# Patient Record
Sex: FEMALE | Employment: FULL TIME | URBAN - METROPOLITAN AREA
[De-identification: names, ages, dates, MRNs, and addresses within clinical notes are randomized per-mention and may not be internally consistent; named-entity substitution may affect disease eponyms.]

---

## 2017-03-22 ENCOUNTER — TELEPHONE (OUTPATIENT)
Dept: INTERNAL MEDICINE CLINIC | Facility: CLINIC | Age: 47
End: 2017-03-22

## 2017-03-22 NOTE — TELEPHONE ENCOUNTER
Patient has a history of low vitamin D levels. She scheduled a physical with Dr Alexandra Awan on Monday and is wondering if she could have a lab order for vitamin D level sent to [facility] prior to her appt.

## 2017-03-22 NOTE — TELEPHONE ENCOUNTER
Spoke to pt. Advised Dr. Mehrdad Gonzalez will order labs at time of visit. Can discuss Vit D level. Pt voiced understanding.

## 2017-03-25 ENCOUNTER — HOSPITAL ENCOUNTER (OUTPATIENT)
Dept: MAMMOGRAPHY | Age: 47
Discharge: HOME | End: 2017-03-25
Attending: OBSTETRICS & GYNECOLOGY
Payer: COMMERCIAL

## 2017-03-25 DIAGNOSIS — Z12.31 ENCOUNTER FOR SCREENING MAMMOGRAM FOR MALIGNANT NEOPLASM OF BREAST: ICD-10-CM

## 2017-03-25 PROCEDURE — 77063 BREAST TOMOSYNTHESIS BI: CPT

## 2017-03-25 PROCEDURE — 77067 SCR MAMMO BI INCL CAD: CPT

## 2017-03-27 ENCOUNTER — OFFICE VISIT (OUTPATIENT)
Dept: INTERNAL MEDICINE CLINIC | Facility: CLINIC | Age: 47
End: 2017-03-27

## 2017-03-27 VITALS
OXYGEN SATURATION: 99 % | WEIGHT: 150 LBS | DIASTOLIC BLOOD PRESSURE: 70 MMHG | RESPIRATION RATE: 12 BRPM | HEART RATE: 82 BPM | HEIGHT: 64.5 IN | SYSTOLIC BLOOD PRESSURE: 120 MMHG | BODY MASS INDEX: 25.3 KG/M2

## 2017-03-27 DIAGNOSIS — Z00.00 ROUTINE PHYSICAL EXAMINATION: Primary | ICD-10-CM

## 2017-03-27 PROCEDURE — 90715 TDAP VACCINE 7 YRS/> IM: CPT | Performed by: INTERNAL MEDICINE

## 2017-03-27 PROCEDURE — 99396 PREV VISIT EST AGE 40-64: CPT | Performed by: INTERNAL MEDICINE

## 2017-03-27 PROCEDURE — 90471 IMMUNIZATION ADMIN: CPT | Performed by: INTERNAL MEDICINE

## 2017-03-29 LAB
ABSOLUTE BASOPHILS: 27 CELLS/UL (ref 0–200)
ABSOLUTE EOSINOPHILS: 82 CELLS/UL (ref 15–500)
ABSOLUTE LYMPHOCYTES: 1673 CELLS/UL (ref 850–3900)
ABSOLUTE MONOCYTES: 340 CELLS/UL (ref 200–950)
ABSOLUTE NEUTROPHILS: 4678 CELLS/UL (ref 1500–7800)
ALBUMIN/GLOBULIN RATIO: 1.3 (CALC) (ref 1–2.5)
ALBUMIN: 4.1 G/DL (ref 3.6–5.1)
ALKALINE PHOSPHATASE: 56 U/L (ref 33–115)
ALT: 15 U/L (ref 6–29)
AST: 20 U/L (ref 10–35)
BASOPHILS: 0.4 %
BILIRUBIN, TOTAL: 0.8 MG/DL (ref 0.2–1.2)
BUN: 10 MG/DL (ref 7–25)
CALCIUM: 9.1 MG/DL (ref 8.6–10.2)
CARBON DIOXIDE: 29 MMOL/L (ref 20–31)
CHLORIDE: 103 MMOL/L (ref 98–110)
CHOL/HDLC RATIO: 3.6 (CALC)
CHOLESTEROL, TOTAL: 228 MG/DL (ref 125–200)
CREATININE: 0.78 MG/DL (ref 0.5–1.1)
EGFR IF AFRICN AM: 106 ML/MIN/1.73M2
EGFR IF NONAFRICN AM: 92 ML/MIN/1.73M2
EOSINOPHILS: 1.2 %
GLOBULIN: 3.1 G/DL (CALC) (ref 1.9–3.7)
GLUCOSE: 87 MG/DL (ref 65–99)
HDL CHOLESTEROL: 63 MG/DL
HEMATOCRIT: 39.2 % (ref 35–45)
HEMOGLOBIN: 12.8 G/DL (ref 11.7–15.5)
LDL-CHOLESTEROL: 125 MG/DL (CALC)
LYMPHOCYTES: 24.6 %
MCH: 29.1 PG (ref 27–33)
MCHC: 32.7 G/DL (ref 32–36)
MCV: 88.9 FL (ref 80–100)
MONOCYTES: 5 %
MPV: 8.3 FL (ref 7.5–12.5)
NEUTROPHILS: 68.8 %
NON-HDL CHOLESTEROL: 165 MG/DL (CALC)
PLATELET COUNT: 277 THOUSAND/UL (ref 140–400)
POTASSIUM: 4.2 MMOL/L (ref 3.5–5.3)
PROTEIN, TOTAL: 7.2 G/DL (ref 6.1–8.1)
RDW: 13.9 % (ref 11–15)
RED BLOOD CELL COUNT: 4.41 MILLION/UL (ref 3.8–5.1)
SODIUM: 138 MMOL/L (ref 135–146)
TRIGLYCERIDES: 202 MG/DL
TSH W/REFLEX TO FT4: 1.69 MIU/L
VITAMIN D, 25-OH, TOTAL: 35 NG/ML (ref 30–100)
WHITE BLOOD CELL COUNT: 6.8 THOUSAND/UL (ref 3.8–10.8)

## 2017-04-03 ENCOUNTER — OFFICE VISIT (OUTPATIENT)
Dept: INTERNAL MEDICINE CLINIC | Facility: CLINIC | Age: 47
End: 2017-04-03

## 2017-04-03 VITALS
RESPIRATION RATE: 12 BRPM | HEART RATE: 79 BPM | OXYGEN SATURATION: 99 % | BODY MASS INDEX: 25.3 KG/M2 | WEIGHT: 150 LBS | SYSTOLIC BLOOD PRESSURE: 110 MMHG | HEIGHT: 64.5 IN | DIASTOLIC BLOOD PRESSURE: 70 MMHG

## 2017-04-03 DIAGNOSIS — S05.01XA CORNEAL ABRASION, RIGHT, INITIAL ENCOUNTER: Primary | ICD-10-CM

## 2017-04-03 PROCEDURE — 99213 OFFICE O/P EST LOW 20 MIN: CPT | Performed by: INTERNAL MEDICINE

## 2017-04-03 RX ORDER — TOBRAMYCIN AND DEXAMETHASONE 3; 1 MG/ML; MG/ML
SUSPENSION/ DROPS OPHTHALMIC
Qty: 1 BOTTLE | Refills: 0 | Status: SHIPPED | OUTPATIENT
Start: 2017-04-03 | End: 2018-04-12 | Stop reason: ALTCHOICE

## 2017-04-03 NOTE — PROGRESS NOTES
Ruth Coughlin is a 40year old female.   HPI:   Jeremías Jesus in right eye late  Thursday  Thought she removed it  Used tweezers  Red the next day Friday  Vision ok  Hx allergies not taking allergy meds  Pt does wear contacs  ALL:  Zithromax               Pa Hypertension       \"parents\"   • Diabetes Father      \"DM2\"   • Diabetes Paternal Grandfather    • Breast Cancer Maternal Grandmother 58   • Alzheimer's[other] [OTHER] Maternal Grandfather    • Thyroid Disorder Mother         REVIEW OF SYSTEMS:   Robyn Gentile

## 2017-05-08 ENCOUNTER — HOSPITAL ENCOUNTER (OUTPATIENT)
Age: 47
Discharge: HOME | End: 2017-05-08
Attending: EMERGENCY MEDICINE
Payer: COMMERCIAL

## 2017-05-08 VITALS
RESPIRATION RATE: 20 BRPM | DIASTOLIC BLOOD PRESSURE: 62 MMHG | TEMPERATURE: 99 F | OXYGEN SATURATION: 98 % | BODY MASS INDEX: 25 KG/M2 | SYSTOLIC BLOOD PRESSURE: 110 MMHG | WEIGHT: 148 LBS | HEART RATE: 71 BPM

## 2017-05-08 DIAGNOSIS — J02.0 STREPTOCOCCAL SORE THROAT: Primary | ICD-10-CM

## 2017-05-08 PROCEDURE — 87430 STREP A AG IA: CPT

## 2017-05-08 PROCEDURE — 99203 OFFICE O/P NEW LOW 30 MIN: CPT

## 2017-05-08 PROCEDURE — 99204 OFFICE O/P NEW MOD 45 MIN: CPT

## 2017-05-08 RX ORDER — AMOXICILLIN 875 MG/1
875 TABLET, COATED ORAL 2 TIMES DAILY
Qty: 20 TABLET | Refills: 0 | Status: SHIPPED | OUTPATIENT
Start: 2017-05-08 | End: 2017-05-18

## 2017-05-08 NOTE — ED PROVIDER NOTES
Patient Seen in: [facility] In [address] [place] [address]    History   Patient presents with:  Sore Throat    Stated Complaint: sore throat     HPI    The patient is a 43-year-old female with no significant past medical history presents now with the UNITS Oral Cap,  Take 1 capsule by mouth daily.        Family History   Problem Relation Age of Onset   • Spina Bifida[other] [OTHER]       fam hx   • Hypertension       \"parents\"   • Diabetes Father      \"DM2\"   • Diabetes Paternal Grandfather    • Cristal EM POCT RAPID STREP - Abnormal; Notable for the following:     POCT Rapid Strep Positive (*)     All other components within normal limits       MDM           Disposition and Plan     Clinical Impression:  Streptococcal sore throat  (primary encounter d

## 2017-05-31 PROCEDURE — 88175 CYTOPATH C/V AUTO FLUID REDO: CPT | Performed by: OBSTETRICS & GYNECOLOGY

## 2017-05-31 PROCEDURE — 87624 HPV HI-RISK TYP POOLED RSLT: CPT | Performed by: OBSTETRICS & GYNECOLOGY

## 2017-08-08 ENCOUNTER — OFFICE VISIT (OUTPATIENT)
Dept: INTERNAL MEDICINE CLINIC | Facility: CLINIC | Age: 47
End: 2017-08-08

## 2017-08-08 VITALS
WEIGHT: 147 LBS | RESPIRATION RATE: 12 BRPM | HEART RATE: 66 BPM | DIASTOLIC BLOOD PRESSURE: 50 MMHG | SYSTOLIC BLOOD PRESSURE: 100 MMHG | BODY MASS INDEX: 24.79 KG/M2 | HEIGHT: 64.5 IN | OXYGEN SATURATION: 99 %

## 2017-08-08 DIAGNOSIS — Z20.818 EXPOSURE TO STREP THROAT: ICD-10-CM

## 2017-08-08 DIAGNOSIS — S56.912A FOREARM STRAIN, LEFT, INITIAL ENCOUNTER: Primary | ICD-10-CM

## 2017-08-08 DIAGNOSIS — S66.912A WRIST STRAIN, LEFT, INITIAL ENCOUNTER: ICD-10-CM

## 2017-08-08 DIAGNOSIS — S66.912A HAND STRAIN, LEFT, INITIAL ENCOUNTER: ICD-10-CM

## 2017-08-08 PROCEDURE — 99214 OFFICE O/P EST MOD 30 MIN: CPT | Performed by: INTERNAL MEDICINE

## 2017-08-08 NOTE — PROGRESS NOTES
Kenan De Leon is a 56year old female.   HPI:   CC: removing grout early July  Pain in left wrist since early July went away for 1 week or two now back  Used wrist brace for two weeks helped  Lifting/setting up  classroom /teacher  Still moving and liftin Onset   • Spina Bifida[Other] [OTHER] Other      spouse   • Hypertension       \"parents\"   • Diabetes Father      \"DM2\"   • Diabetes Paternal Grandfather    • Breast Cancer Maternal Grandmother 58   • Alzheimer's[Other] [OTHER] Maternal Grandfather

## 2017-10-30 PROCEDURE — 87660 TRICHOMONAS VAGIN DIR PROBE: CPT | Performed by: OBSTETRICS & GYNECOLOGY

## 2017-10-30 PROCEDURE — 87510 GARDNER VAG DNA DIR PROBE: CPT | Performed by: OBSTETRICS & GYNECOLOGY

## 2017-10-30 PROCEDURE — 87480 CANDIDA DNA DIR PROBE: CPT | Performed by: OBSTETRICS & GYNECOLOGY

## 2018-04-16 ENCOUNTER — OFFICE VISIT (OUTPATIENT)
Dept: INTERNAL MEDICINE CLINIC | Facility: CLINIC | Age: 48
End: 2018-04-16

## 2018-04-16 VITALS
RESPIRATION RATE: 12 BRPM | WEIGHT: 152.5 LBS | BODY MASS INDEX: 26.03 KG/M2 | HEIGHT: 64 IN | TEMPERATURE: 98 F | HEART RATE: 72 BPM | SYSTOLIC BLOOD PRESSURE: 100 MMHG | DIASTOLIC BLOOD PRESSURE: 60 MMHG

## 2018-04-16 DIAGNOSIS — Z00.00 ROUTINE PHYSICAL EXAMINATION: Primary | ICD-10-CM

## 2018-04-16 PROCEDURE — 99396 PREV VISIT EST AGE 40-64: CPT | Performed by: INTERNAL MEDICINE

## 2018-04-16 NOTE — PROGRESS NOTES
HPI:   Gladys Siddiqi is a 56year old female who presents for a complete physical exam.    Wt Readings from Last 6 Encounters:  04/16/18 : 152 lb 8 oz  03/21/18 : 151 lb  10/30/17 : 147 lb 14.4 oz  08/08/17 : 147 lb  05/31/17 : 146 lb 1.6 oz  05/08/17 : unspecified 4/11/2007   • Vitamin D deficiency 2/10/2012      Past Surgical History:  1/24/12: OTHER      Comment: Cervical polyp removal  6/11/13: OTHER      Comment: Cervical polyp removal   Family History   Problem Relation Age of Onset   • Breast Cance adult)   Pulse 72   Temp 97.9 °F (36.6 °C) (Oral)   Resp 12   Ht 64\"   Wt 152 lb 8 oz   LMP 03/27/2018 (Exact Date)   BMI 26.18 kg/m²   Body mass index is 26.18 kg/m².     GENERAL: well developed, well nourished,in no apparent distress  SKIN: no rashes,no

## 2018-04-26 ENCOUNTER — TELEPHONE (OUTPATIENT)
Dept: INTERNAL MEDICINE CLINIC | Facility: CLINIC | Age: 48
End: 2018-04-26

## 2018-04-26 DIAGNOSIS — Z00.00 EXAMINATION, MEDICAL, GENERAL: Primary | ICD-10-CM

## 2018-04-26 NOTE — TELEPHONE ENCOUNTER
Patient did some of her lab work that Dr Ernie Marina ordered at [address] [facility], Vitamin D and thyroid, because she did not fast.    Please change the lab for the CMP and lipid panel orders to an [facility].

## 2018-04-26 NOTE — TELEPHONE ENCOUNTER
Reordered Lipid Panel and CMP through [facility] for fasting blood draw. Patient could not get them done at [facility] as she was not fasting. Notified the patient.

## 2018-04-28 ENCOUNTER — APPOINTMENT (OUTPATIENT)
Dept: LAB | Age: 48
End: 2018-04-28
Attending: INTERNAL MEDICINE
Payer: COMMERCIAL

## 2018-04-28 PROCEDURE — 36415 COLL VENOUS BLD VENIPUNCTURE: CPT | Performed by: INTERNAL MEDICINE

## 2018-04-28 PROCEDURE — 80050 GENERAL HEALTH PANEL: CPT | Performed by: INTERNAL MEDICINE

## 2018-04-28 PROCEDURE — 80061 LIPID PANEL: CPT | Performed by: INTERNAL MEDICINE

## 2018-05-03 ENCOUNTER — TELEPHONE (OUTPATIENT)
Dept: INTERNAL MEDICINE CLINIC | Facility: CLINIC | Age: 48
End: 2018-05-03

## 2018-05-03 NOTE — TELEPHONE ENCOUNTER
Noted below, and detailed message on cell phone okay per HIPAA. Left a detailed message that the patient's Vitamin D level, Thyroid level, Glucose level, Kidney tests and Liver tests were all normal, and showed no signs of anemia, per Dr. Mansi Chisholm.  Also inform

## 2018-05-14 ENCOUNTER — OFFICE VISIT (OUTPATIENT)
Dept: INTERNAL MEDICINE CLINIC | Facility: CLINIC | Age: 48
End: 2018-05-14

## 2018-05-14 ENCOUNTER — TELEPHONE (OUTPATIENT)
Dept: INTERNAL MEDICINE CLINIC | Facility: CLINIC | Age: 48
End: 2018-05-14

## 2018-05-14 VITALS
DIASTOLIC BLOOD PRESSURE: 72 MMHG | TEMPERATURE: 99 F | HEART RATE: 72 BPM | SYSTOLIC BLOOD PRESSURE: 120 MMHG | HEIGHT: 63.25 IN | WEIGHT: 150.19 LBS | OXYGEN SATURATION: 98 % | RESPIRATION RATE: 16 BRPM | BODY MASS INDEX: 26.28 KG/M2

## 2018-05-14 DIAGNOSIS — J06.9 VIRAL URI WITH COUGH: Primary | ICD-10-CM

## 2018-05-14 PROCEDURE — 99213 OFFICE O/P EST LOW 20 MIN: CPT | Performed by: INTERNAL MEDICINE

## 2018-05-14 RX ORDER — CETIRIZINE HYDROCHLORIDE 10 MG/1
10 TABLET ORAL NIGHTLY
COMMUNITY

## 2018-05-14 NOTE — TELEPHONE ENCOUNTER
Productive cough with yellow colored phlegm in the morning (\"feels like she has something in her chest she has to get out\"), dry cough though out the rest of the day, will feel like she needs to take deeper breaths at times but in no acute distress at ti

## 2018-05-14 NOTE — PATIENT INSTRUCTIONS
Call if worsening symptoms over the next week or so, meaning fevers getting higher or more frequent, respiratory distress, unable to keep oral fluids down or unable to care for self

## 2018-05-14 NOTE — PROGRESS NOTES
Gladys Siddiqi is a 56year old female  Patient presents with:  Chest Congestion: Tighness in chest coughing up a Sputum x2 and sorness in throat      HPI:   Allergies were acting up last week, took zyrtec then 2 days ago got sick, cough, tightness, dark this encounter. Meds & Refills for this Visit:    No prescriptions requested or ordered in this encounter       Imaging & Consults:  None    No Follow-up on file.     Patient Instructions     Call if worsening symptoms over the next week or so, meaning

## 2019-02-02 PROCEDURE — 87081 CULTURE SCREEN ONLY: CPT | Performed by: EMERGENCY MEDICINE

## 2019-11-18 NOTE — PROGRESS NOTES
HPI:   Sharolyn Mcburney is a 40year old female who presents for a complete physical exam.  Would like Vit D check    Wt Readings from Last 6 Encounters:  03/27/17 : 150 lb  06/18/15 : 146 lb  02/18/15 : 144 lb  01/10/15 : 145 lb  04/02/14 : 134 lb  11/30/1 1/28/2011   • Acute bronchitis 4/29/2011   • Hx of anxiety disorder 1/16/2012   • Cervical polyp 1/16/2012   • Uterine polyp 1/16/2012   • SOB (shortness of breath) 1/16/2012     \"with stress\"   • Weight loss 1/16/2012     \"unclear? \"   • Dizziness 2/10 erythema, oropharynx clear, posterior pharynx without erythema  EYES:PERRLA, EOMI, conjunctiva are clear, sclera anicterus  NECK: supple,no adenopathy,no bruits, no thyromegaly, no thyroid nodules  CHEST: no chest tenderness  LUNGS: clear to auscultation Detail Level: Detailed Quality 111:Pneumonia Vaccination Status For Older Adults: Pneumococcal Vaccination Previously Received Quality 130: Documentation Of Current Medications In The Medical Record: Current Medications Documented Quality 110: Preventive Care And Screening: Influenza Immunization: Influenza Immunization previously received during influenza season

## 2020-02-06 PROCEDURE — 88305 TISSUE EXAM BY PATHOLOGIST: CPT | Performed by: RADIOLOGY

## 2020-02-26 PROCEDURE — 88305 TISSUE EXAM BY PATHOLOGIST: CPT | Performed by: OBSTETRICS & GYNECOLOGY

## 2020-09-18 PROCEDURE — 88305 TISSUE EXAM BY PATHOLOGIST: CPT | Performed by: OBSTETRICS & GYNECOLOGY

## 2020-10-13 PROBLEM — S46.912A STRAIN OF LEFT SHOULDER, INITIAL ENCOUNTER: Status: ACTIVE | Noted: 2020-10-13

## 2020-11-02 PROBLEM — S46.912D STRAIN OF LEFT SHOULDER, SUBSEQUENT ENCOUNTER: Status: ACTIVE | Noted: 2020-10-13

## 2021-01-11 PROCEDURE — 88305 TISSUE EXAM BY PATHOLOGIST: CPT | Performed by: RADIOLOGY

## 2021-04-24 PROBLEM — M50.30 DEGENERATIVE DISC DISEASE, CERVICAL: Status: ACTIVE | Noted: 2021-04-24

## 2021-06-04 ENCOUNTER — TELEPHONE (OUTPATIENT)
Dept: MEDSURG UNIT | Facility: HOSPITAL | Age: 51
End: 2021-06-04

## 2021-06-04 PROBLEM — M75.02 ADHESIVE CAPSULITIS OF LEFT SHOULDER: Status: ACTIVE | Noted: 2021-06-04

## 2021-06-04 NOTE — TELEPHONE ENCOUNTER
Pt has a np appt with Dr Prabhu Bosch 8/11. 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 For fear she will be reprimanded, is there any way Dr Prabhu Bosch can see her sooner? Pt has several PT appts but will miss that to see Dr Prabhu Bosch.

## 2021-09-30 PROBLEM — M75.112 NONTRAUMATIC INCOMPLETE TEAR OF LEFT ROTATOR CUFF: Status: ACTIVE | Noted: 2021-09-30

## (undated) NOTE — LETTER
[address]  [address]  Dept: [phone]  Dept Fax: [phone]  Loc: [phone]      May 8, 2017    Patient: Yolanda Christine   Date of Visit: 5/8/2017       To Whom It May Concern:    Sarath Rolon

## (undated) NOTE — ED AVS SNAPSHOT
[facility] in [facility].  [address] [facility]    Phone:  [phone]    Fax:  [phone]           Sabina Kathy   MRN: [id number]    Department:  [facility] in [address] [place] [address]   Date of Visit: and physician's office to determine coverage and benefits available for follow-up care and referrals. It is our goal to assure that you are completely satisfied with every aspect of your visit today.   In an effort to constantly improve our service to y Any imaging studies and labs completed today can be reviewed in your MyChart account. You may have had testing done that requires us to contact you. Please make sure we have your correct phone number on file.      OUR CURRENT HOURS OF OPERATION:  MONDAY T and ask to get set up for an insurance coverage that is in-network with [employer] [facility]. [employer]     Sign up for [employer], your secure online medical record.   [employer] will allow you to access patient instructions from your recent visit,  view

## (undated) NOTE — MR AVS SNAPSHOT
[address] [address] [phone]               Thank you for choosing us for your health care visit with Geoffrey Jarrell DO.   We are glad to serve you and happy to provide you with th Sign up for [employer], your secure online medical record. [employer] will allow you to access patient instructions from your recent visit,  view other health information, and more. To sign up or find more information, go to [email or web address] and cl increments are effective and add up over the week   2 ½ hours per week – spread out over time Use a tosin to keep you motivated   Don’t forget strength training with weights and resistance Set goals and track your progress   You don’t need to join a gym.

## (undated) NOTE — MR AVS SNAPSHOT
[address] [id number] [phone]               Thank you for choosing us for your health care visit with Chantel Sarmiento DO.   We are glad to serve you and happy to provide you with th visit,  view other health information, and more. To sign up or find more information, go to [email or web address] and click on the Sign Up Now link in the [employer] box.      Enter your [employer] Activation Code exactly as it appears below along with yo